# Patient Record
Sex: MALE | Race: WHITE | NOT HISPANIC OR LATINO | Employment: OTHER | URBAN - METROPOLITAN AREA
[De-identification: names, ages, dates, MRNs, and addresses within clinical notes are randomized per-mention and may not be internally consistent; named-entity substitution may affect disease eponyms.]

---

## 2020-10-28 RX ORDER — SODIUM, POTASSIUM,MAG SULFATES 17.5-3.13G
2 SOLUTION, RECONSTITUTED, ORAL ORAL TAKE AS DIRECTED
Qty: 354 ML | Refills: 0 | Status: SHIPPED | OUTPATIENT
Start: 2020-10-28

## 2020-11-01 ENCOUNTER — APPOINTMENT (OUTPATIENT)
Dept: PREADMISSION TESTING | Facility: HOSPITAL | Age: 75
End: 2020-11-01

## 2020-11-01 LAB — SARS-COV-2 RNA RESP QL NAA+PROBE: NOT DETECTED

## 2020-11-01 PROCEDURE — C9803 HOPD COVID-19 SPEC COLLECT: HCPCS

## 2020-11-01 PROCEDURE — U0004 COV-19 TEST NON-CDC HGH THRU: HCPCS

## 2020-11-03 ENCOUNTER — OUTSIDE FACILITY SERVICE (OUTPATIENT)
Dept: GASTROENTEROLOGY | Facility: CLINIC | Age: 75
End: 2020-11-03

## 2020-11-03 PROCEDURE — G0105 COLORECTAL SCRN; HI RISK IND: HCPCS | Performed by: INTERNAL MEDICINE

## 2023-04-11 ENCOUNTER — OFFICE VISIT (OUTPATIENT)
Dept: GASTROENTEROLOGY | Facility: CLINIC | Age: 78
End: 2023-04-11
Payer: MEDICARE

## 2023-04-11 VITALS
DIASTOLIC BLOOD PRESSURE: 78 MMHG | BODY MASS INDEX: 24.46 KG/M2 | HEIGHT: 72 IN | WEIGHT: 180.6 LBS | SYSTOLIC BLOOD PRESSURE: 128 MMHG

## 2023-04-11 DIAGNOSIS — M54.50 CHRONIC LOW BACK PAIN, UNSPECIFIED BACK PAIN LATERALITY, UNSPECIFIED WHETHER SCIATICA PRESENT: ICD-10-CM

## 2023-04-11 DIAGNOSIS — Z78.9 AT AVERAGE RISK FOR COLON CANCER: ICD-10-CM

## 2023-04-11 DIAGNOSIS — R15.9 FULL INCONTINENCE OF FECES: Primary | ICD-10-CM

## 2023-04-11 DIAGNOSIS — G89.29 CHRONIC LOW BACK PAIN, UNSPECIFIED BACK PAIN LATERALITY, UNSPECIFIED WHETHER SCIATICA PRESENT: ICD-10-CM

## 2023-04-11 PROCEDURE — 1160F RVW MEDS BY RX/DR IN RCRD: CPT | Performed by: INTERNAL MEDICINE

## 2023-04-11 PROCEDURE — 99214 OFFICE O/P EST MOD 30 MIN: CPT | Performed by: INTERNAL MEDICINE

## 2023-04-11 PROCEDURE — 1159F MED LIST DOCD IN RCRD: CPT | Performed by: INTERNAL MEDICINE

## 2023-04-11 RX ORDER — TRAVOPROST OPHTHALMIC SOLUTION 0.04 MG/ML
SOLUTION OPHTHALMIC
COMMUNITY

## 2023-04-11 RX ORDER — SULINDAC 200 MG/1
TABLET ORAL
COMMUNITY
End: 2023-04-11

## 2023-04-11 RX ORDER — LATANOPROST 50 UG/ML
SOLUTION/ DROPS OPHTHALMIC
COMMUNITY
Start: 2023-03-13

## 2023-04-11 RX ORDER — CLARITHROMYCIN 250 MG/1
250 TABLET, FILM COATED ORAL
COMMUNITY

## 2023-04-11 RX ORDER — MELOXICAM 15 MG/1
TABLET ORAL
COMMUNITY

## 2023-04-11 RX ORDER — TRIAMCINOLONE ACETONIDE 1 MG/G
CREAM TOPICAL
COMMUNITY

## 2023-04-11 RX ORDER — PREDNISONE 1 MG/1
TABLET ORAL
COMMUNITY
Start: 2023-03-20

## 2023-04-11 RX ORDER — SILDENAFIL CITRATE 20 MG/1
TABLET ORAL
COMMUNITY

## 2023-04-11 RX ORDER — ALPRAZOLAM 0.5 MG/1
TABLET ORAL
COMMUNITY

## 2023-04-11 RX ORDER — PREDNISOLONE ACETATE 10 MG/ML
1 SUSPENSION/ DROPS OPHTHALMIC DAILY
COMMUNITY
Start: 2023-03-13

## 2023-04-11 RX ORDER — ACYCLOVIR 800 MG/1
TABLET ORAL
COMMUNITY

## 2023-04-11 RX ORDER — MULTIVITAMIN
1 CAPSULE ORAL DAILY
COMMUNITY

## 2023-04-11 RX ORDER — ALPRAZOLAM 0.5 MG/1
TABLET ORAL
COMMUNITY
Start: 2023-04-03 | End: 2023-04-11 | Stop reason: SDUPTHER

## 2023-04-11 RX ORDER — AZITHROMYCIN MONOHYDRATE 10 MG/ML
SOLUTION/ DROPS OPHTHALMIC
COMMUNITY

## 2023-04-11 RX ORDER — MIRTAZAPINE 30 MG/1
TABLET, FILM COATED ORAL
COMMUNITY
Start: 2023-03-20

## 2023-04-11 RX ORDER — FINASTERIDE 5 MG/1
TABLET, FILM COATED ORAL
COMMUNITY

## 2023-04-11 RX ORDER — DORZOLAMIDE HYDROCHLORIDE AND TIMOLOL MALEATE 20; 5 MG/ML; MG/ML
SOLUTION/ DROPS OPHTHALMIC
COMMUNITY
Start: 2023-03-06

## 2023-04-11 RX ORDER — MECLIZINE HYDROCHLORIDE 25 MG/1
TABLET ORAL EVERY 8 HOURS SCHEDULED
COMMUNITY

## 2023-04-11 RX ORDER — LOTEPREDNOL ETABONATE 5 MG/ML
SUSPENSION/ DROPS OPHTHALMIC
COMMUNITY

## 2023-04-11 NOTE — PROGRESS NOTES
"GASTROENTEROLOGY OFFICE NOTE  Ezio Fisher  [id number]  1945      Chief Complaint   Patient presents with   • Fecal Incontinence        HISTORY OF PRESENT ILLNESS:  Patient known to me.  He presents with fecal incontinence.  This has been going on for years but has been worsening.  He will have stephen episodes of incontinence with well-formed stools.  No antecedent warning.  He is not having any problems with diarrhea.  Dietary habits seem to be unrelated to these episodes which occur \"most days \".  Again this has been going on for years.  His last colonoscopy was within normal limits in 2020.    He denies dysphagia to solids, odynophagia, early satiety, unexplained weight loss, melena or bright red blood per rectum and is not really having any problems constipation or diarrhea.    PAST MEDICAL HISTORY  Benign prostatic hypertrophy  Insomnia  Anxiety      PAST SURGICAL HISTORY  Past Surgical History:   • BACK SURGERY   • COLONOSCOPY        MEDICATIONS:    Current Outpatient Medications:   •  ALPRAZolam (XANAX) 0.5 MG tablet, alprazolam 0.5 mg tablet, Disp: , Rfl:   •  azithromycin (AzaSite) 1 % ophthalmic solution, Azasite 1 % eye drops, Disp: , Rfl:   •  clarithromycin (BIAXIN) 250 MG tablet, Take 1 tablet by mouth., Disp: , Rfl:   •  dorzolamide-timolol (COSOPT) 22.3-6.8 MG/ML ophthalmic solution, INSTILL ONE DROP TO THE LEFT EYE DAILY, Disp: , Rfl:   •  finasteride (PROSCAR) 5 MG tablet, finasteride 5 mg tablet, Disp: , Rfl:   •  latanoprost (XALATAN) 0.005 % ophthalmic solution, , Disp: , Rfl:   •  loteprednol (LOTEMAX) 0.5 % ophthalmic suspension, Lotemax 0.5 % eye drops,suspension, Disp: , Rfl:   •  meclizine (ANTIVERT) 25 MG tablet, Every 8 (Eight) Hours., Disp: , Rfl:   •  meloxicam (MOBIC) 15 MG tablet, meloxicam 15 mg tablet, Disp: , Rfl:   •  mirtazapine (REMERON) 30 MG tablet, , Disp: , Rfl:   •  Multiple Vitamin (multivitamin) capsule, Take 1 capsule by mouth Daily., Disp: , Rfl:   •  prednisoLONE " "acetate (PRED FORTE) 1 % ophthalmic suspension, Apply 1 drop to eye(s) as directed by provider Daily., Disp: , Rfl:   •  predniSONE (DELTASONE) 1 MG tablet, , Disp: , Rfl:   •  travoprost, BAK free, (TRAVATAN) 0.004 % solution ophthalmic solution, Travatan (with benzalkonium) 0.004 % eye drops  Every evening, Disp: , Rfl:   •  triamcinolone (KENALOG) 0.1 % cream, triamcinolone acetonide 0.1 % topical cream, Disp: , Rfl:   •  acyclovir (ZOVIRAX) 800 MG tablet, acyclovir 800 mg tablet, Disp: , Rfl:   •  sildenafil (REVATIO) 20 MG tablet, sildenafil (pulmonary hypertension) 20 mg tablet  Take 1 tablet as needed by oral route. (Patient not taking: Reported on 4/11/2023), Disp: , Rfl:     ALLERGIES  is allergic to cefadroxil, ciprofloxacin hcl, shellfish allergy, sulfa antibiotics, and trimethoprim.    FAMILY HISTORY:  Cancer-related family history includes Colon cancer in his father.  Colon Cancer-related family history includes Colon cancer in his father.    SOCIAL HISTORY  He  reports that he has never smoked. He has never used smokeless tobacco. He reports that he does not use drugs.   He is single.  Does not have any children.  He is a retired .  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  He is a non-smoker/nondrinker.  He has an 11-year-old Collie      PHYSICAL EXAM   /78 (BP Location: Right arm, Patient Position: Sitting, Cuff Size: Adult)   Ht 182.9 cm (72\")   Wt 81.9 kg (180 lb 9.6 oz)   BMI 24.49 kg/m²   General: Alert and oriented x 3. In no apparent or acute distress.  and No stigmata of chronic liver disease  HEENT: Anicteric sclerae. Normal oropharynx  Neck: Supple. Without lymphadenopathy  CV: Regular rate and rhythm, S1, S2  Lungs: Clear to ausculation. Without rales, rhonchi and wheezing  Abdomen:  Soft,non-distended without palpable masses or hepatosplenomeagaly, areas of rebound tenderness or guarding.   Extremeties: without clubbing, cyanosis or edema  Neurologic:  Alert and " oriented x 3 without focal motor or sensory deficits  Rectal exam: Grossly normal anal sphincter tone.  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  2.-  Patient up-to-date on his colon cancer screening status post unremarkable colonoscopy November 2020  3.-  Chronic back pain.  Consideration to be given to neurological involvement causing his fecal incontinence.  Pelvic MRI may be in order    PLAN  1.-  Conservative management to include the following  2.-  Timed bowel movements.  Patient is suggested to induce a bowel movement, with glycerin suppositories, at 8 AM, noon, 4 PM and 8 PM  3.-  Rectal Kegel exercise.  Literature provided.  This is for retraining of his anal sphincter and strengthening.  4.-  1 single daily dose of loperamide is recommended to increase anal sphincter tone.  If this does not produce constipation he can go up to 2/day as long as constipation is not a problem  5.-  Return appointment in 8 weeks  6.-  Consider referral for anorectal manometry and proctoscopy etc. at specialized center      Arie Iyer MD  4/11/2023   19:11 EDT